# Patient Record
Sex: FEMALE | Race: WHITE | URBAN - METROPOLITAN AREA
[De-identification: names, ages, dates, MRNs, and addresses within clinical notes are randomized per-mention and may not be internally consistent; named-entity substitution may affect disease eponyms.]

---

## 2017-05-30 ENCOUNTER — OFFICE VISIT (OUTPATIENT)
Dept: FAMILY MEDICINE CLINIC | Facility: CLINIC | Age: 22
End: 2017-05-30

## 2017-05-30 VITALS
HEIGHT: 68.25 IN | RESPIRATION RATE: 16 BRPM | WEIGHT: 148.25 LBS | TEMPERATURE: 98 F | BODY MASS INDEX: 22.47 KG/M2 | DIASTOLIC BLOOD PRESSURE: 70 MMHG | HEART RATE: 86 BPM | SYSTOLIC BLOOD PRESSURE: 118 MMHG

## 2017-05-30 DIAGNOSIS — N92.0 MENORRHAGIA WITH REGULAR CYCLE: Primary | ICD-10-CM

## 2017-05-30 DIAGNOSIS — L73.9 FOLLICULITIS: ICD-10-CM

## 2017-05-30 DIAGNOSIS — Z12.4 PAP SMEAR FOR CERVICAL CANCER SCREENING: ICD-10-CM

## 2017-05-30 PROCEDURE — 88175 CYTOPATH C/V AUTO FLUID REDO: CPT | Performed by: FAMILY MEDICINE

## 2017-05-30 PROCEDURE — 99204 OFFICE O/P NEW MOD 45 MIN: CPT | Performed by: FAMILY MEDICINE

## 2017-05-30 RX ORDER — NORGESTIMATE AND ETHINYL ESTRADIOL 7DAYSX3 28
1 KIT ORAL DAILY
Qty: 3 PACKAGE | Refills: 3 | Status: SHIPPED | OUTPATIENT
Start: 2017-05-30 | End: 2017-11-20

## 2017-05-30 RX ORDER — ALBUTEROL SULFATE 90 UG/1
2 AEROSOL, METERED RESPIRATORY (INHALATION) AS NEEDED
COMMUNITY
End: 2018-01-10

## 2017-05-30 NOTE — PROGRESS NOTES
Precious Najjar is a 22year old female. HPI:       January-- noted back pain with menses. For last few months, legs tingly and sore, legs feel heavy.   Saw OB/GYNE- [facility]- no exam.  Offered ocp, told may be normal    Had \"bumps on vagina- inte NORMAL  VAGINA- NO DISCHAGE  CERVIX-NO LESION, PAP DONE  BIMANUAL- NO UTERINE OR ADENEXAL TENDERNESS OR MASSES    ASSESSMENT AND PLAN:   Pt presents for eval of menorhhagia  AND HX OF FOLLICULITIS  .      Patient Instructions   D/w pt use of HIBICLENSE anti

## 2017-05-30 NOTE — PATIENT INSTRUCTIONS
D/w pt use of HIBICLENSE antibacterial soap, use of disposable razors, rec appt when has gyne lesion for further eval    Pap today, HPV reflex if pap abnormal    recomment gardisil--HPV vaccine series    OCP to manange menses, cramping.   Recheck 3 months s

## 2017-06-06 ENCOUNTER — TELEPHONE (OUTPATIENT)
Dept: FAMILY MEDICINE CLINIC | Facility: CLINIC | Age: 22
End: 2017-06-06

## 2017-07-18 ENCOUNTER — TELEPHONE (OUTPATIENT)
Dept: FAMILY MEDICINE CLINIC | Facility: CLINIC | Age: 22
End: 2017-07-18

## 2017-07-18 NOTE — TELEPHONE ENCOUNTER
Pt can try restarting ocp after next menses with a meal. Or can try alternative pill ( ortho tri low or loestrin) OR alternative Contraceptive method.

## 2017-07-18 NOTE — TELEPHONE ENCOUNTER
Pt was seen on 5/30- placed on BCP- Ortho Tri-Cyclen. Pt stopped 1.5 wks ago due to severe nausea- pt would take at night, but does work some nights. Pt states nausea stopped when she stopped the pill. Please advise. LMP:  7/4/17.

## 2017-07-18 NOTE — TELEPHONE ENCOUNTER
Pt has decided to retry Ortho Tri-Cyclen again, pt will start pill pack with next cycle. Pt will monitor s/s and will call if needed -  urged pt not to stop abruptly and to call office with concerns.

## 2017-08-02 ENCOUNTER — TELEPHONE (OUTPATIENT)
Dept: FAMILY MEDICINE CLINIC | Facility: CLINIC | Age: 22
End: 2017-08-02

## 2017-08-02 NOTE — TELEPHONE ENCOUNTER
The menstrual period is the shedding of the lining of the uterus, therefore sometimes she will see tissue. Patient should let us know if she has heavy bleeding or other concerns.

## 2017-08-02 NOTE — TELEPHONE ENCOUNTER
Pt states she started her period today, states she passed a large clot that looked grey in color, states it looked like tissue. Pt started bitth control in May. Pt states her menstrul flow is normal, but wanted to let us know.

## 2017-11-20 ENCOUNTER — OFFICE VISIT (OUTPATIENT)
Dept: FAMILY MEDICINE CLINIC | Facility: CLINIC | Age: 22
End: 2017-11-20

## 2017-11-20 ENCOUNTER — TELEPHONE (OUTPATIENT)
Dept: FAMILY MEDICINE CLINIC | Facility: CLINIC | Age: 22
End: 2017-11-20

## 2017-11-20 VITALS
BODY MASS INDEX: 22.23 KG/M2 | HEART RATE: 72 BPM | SYSTOLIC BLOOD PRESSURE: 114 MMHG | DIASTOLIC BLOOD PRESSURE: 78 MMHG | RESPIRATION RATE: 16 BRPM | TEMPERATURE: 98 F | HEIGHT: 68.5 IN | WEIGHT: 148.38 LBS
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
## 2017-11-20 DIAGNOSIS — R11.0 NAUSEA: ICD-10-CM

## 2017-11-20 DIAGNOSIS — S00.03XA HEMATOMA OF SCALP, INITIAL ENCOUNTER: ICD-10-CM

## 2017-11-20 DIAGNOSIS — S06.0X0A CONCUSSION WITHOUT LOSS OF CONSCIOUSNESS, INITIAL ENCOUNTER: ICD-10-CM

## 2017-11-20 DIAGNOSIS — R42 DIZZINESS: ICD-10-CM

## 2017-11-20 DIAGNOSIS — S09.90XA INJURY OF HEAD, INITIAL ENCOUNTER: Primary | ICD-10-CM

## 2017-11-20 PROCEDURE — 99214 OFFICE O/P EST MOD 30 MIN: CPT | Performed by: FAMILY MEDICINE

## 2017-11-20 NOTE — PROGRESS NOTES
[facility] [place]  PROGRESS NOTE  Chief Complaint:   Patient presents with:  Head Injury: nausea last night, light sensitive, head feels heavy, headache, vision is more blurry       HPI:   This is a 22year old female presents with mom for eval Specimen Adequacy Satisfactory for evaluation.  Endocervical or metaplastic cells present    General Categorization Negative for intraepithelial lesion or malignancy     Final Diagnosis Comment Negative for intraepithelial lesion or malignancy [FORMATTING R CARDIOVASCULAR:  Denies chest pain, chest pressure, chest discomfort, palpitations, edema, dyspnea on exertion or at rest.  RESPIRATORY:  Denies shortness of breath, wheezing, cough or sputum.   GASTROINTESTINAL:  Denies abdominal pain, nausea, vomiting, co ABDOMEN: Soft, nondistended, nontender, bowel sounds normal in all 4 quadrants, no Masses, no hepatosplenomegaly. SKIN: No rashes, no skin lesion, no bruising, good turgor. LYMPHATIC: No cervical lymphadenopathy, no other lymphadenopathy.   Yvette Grain Do's and don'ts:   · Ask a friend or family member to stay with you for a few days. You should not be alone until you know how the injury has affected you. · Tell your caregiver to wake you every 2 to 3 hours during the first night.  Your caregiver should © 6006-0751 [employer] [address] [facility] [place], [facility] [place]. All rights reserved. This information is not intended as a substitute for professional medical care. Always follow your healthcare professional's instructions.             Emmy

## 2017-11-20 NOTE — PATIENT INSTRUCTIONS
Recommend to go to [facility] for stat CT scan of head. No school or work for today and tomorrow. Recommend rest, avoid any use of phone, computer, tv for next couple days. No exercise for next 2 days. Go to ER if symptoms worse.    Return healthcare provider and your family.  Work closely with your healthcare provider and give your brain time to heal.  Follow-up care  Follow up with your healthcare provider, or as advised.     When to call your healthcare provider  Your caregiver should call

## 2017-11-20 NOTE — TELEPHONE ENCOUNTER
Informed Tiffanie that Per authorization dept that its approved but Monika Gave is reaching out to patient to verify location. No other questions at this time.

## 2017-12-06 ENCOUNTER — OFFICE VISIT (OUTPATIENT)
Dept: FAMILY MEDICINE CLINIC | Facility: CLINIC | Age: 22
End: 2017-12-06

## 2017-12-06 ENCOUNTER — HOSPITAL ENCOUNTER (OUTPATIENT)
Dept: GENERAL RADIOLOGY | Age: 22
Discharge: HOME | End: 2017-12-06
Attending: NURSE PRACTITIONER
Payer: COMMERCIAL

## 2017-12-06 VITALS
OXYGEN SATURATION: 98 % | BODY MASS INDEX: 23 KG/M2 | WEIGHT: 151.63 LBS | SYSTOLIC BLOOD PRESSURE: 110 MMHG | DIASTOLIC BLOOD PRESSURE: 80 MMHG | TEMPERATURE: 98 F | HEART RATE: 93 BPM

## 2017-12-06 DIAGNOSIS — R05.9 COUGH: ICD-10-CM

## 2017-12-06 DIAGNOSIS — J40 BRONCHITIS: ICD-10-CM

## 2017-12-06 DIAGNOSIS — R05.9 COUGH: Primary | ICD-10-CM

## 2017-12-06 PROBLEM — F50.9 EATING DISORDER: Status: ACTIVE | Noted: 2017-10-08

## 2017-12-06 PROCEDURE — 99213 OFFICE O/P EST LOW 20 MIN: CPT | Performed by: NURSE PRACTITIONER

## 2017-12-06 PROCEDURE — 71020 XR CHEST PA + LAT CHEST (CPT=71020): CPT | Performed by: NURSE PRACTITIONER

## 2017-12-06 RX ORDER — ALBUTEROL SULFATE 2.5 MG/3ML
2.5 SOLUTION RESPIRATORY (INHALATION) EVERY 4 HOURS PRN
Qty: 1 BOX | Refills: 1 | Status: SHIPPED | OUTPATIENT
Start: 2017-12-06

## 2017-12-06 RX ORDER — PREDNISONE 20 MG/1
TABLET ORAL
Qty: 18 TABLET | Refills: 0 | Status: SHIPPED | OUTPATIENT
Start: 2017-12-06

## 2017-12-06 RX ORDER — AZITHROMYCIN 500 MG/1
500 TABLET, FILM COATED ORAL DAILY
Qty: 7 TABLET | Refills: 0 | Status: SHIPPED | OUTPATIENT
Start: 2017-12-06 | End: 2017-12-13

## 2017-12-06 NOTE — PATIENT INSTRUCTIONS
Rest, increase fluids, salt water gargles ,chari pot (use distilled water) or saline nasal spray, Mucinex-D (behind the counter), Tylenol/Ibuprofen, follow up if symptoms persist or increase.

## 2017-12-06 NOTE — PROGRESS NOTES
CHIEF COMPLAINT:   Patient presents with:  Cough: for 2 weeks now  Sinus Problem      HPI:   Vinayak Ty is a 25year old female who presents to clinic today with complaints of feeling ill for 2 weeks- started with coughing and chest tight conjunctiva clear, no discharge  EARS:. Tympanic membranes are clear bilaterally  NOSE: nostrils patent, mild congestion  LYMPH: Few anterior cervical bilateral lymphadenopathy.     THROAT: oral mucosa pink, moist. Posterior pharynx* erythematous or inject

## 2017-12-12 ENCOUNTER — OFFICE VISIT (OUTPATIENT)
Dept: FAMILY MEDICINE CLINIC | Facility: CLINIC | Age: 22
End: 2017-12-12

## 2017-12-12 VITALS
SYSTOLIC BLOOD PRESSURE: 116 MMHG | DIASTOLIC BLOOD PRESSURE: 68 MMHG | BODY MASS INDEX: 23.17 KG/M2 | RESPIRATION RATE: 32 BRPM | HEIGHT: 67.25 IN | HEART RATE: 92 BPM | TEMPERATURE: 98 F | WEIGHT: 149.38 LBS | OXYGEN SATURATION: 100 %

## 2017-12-12 DIAGNOSIS — R05.9 COUGH: Primary | ICD-10-CM

## 2017-12-12 DIAGNOSIS — J40 BRONCHITIS: ICD-10-CM

## 2017-12-12 PROCEDURE — 94060 EVALUATION OF WHEEZING: CPT | Performed by: FAMILY MEDICINE

## 2017-12-12 PROCEDURE — 87798 DETECT AGENT NOS DNA AMP: CPT | Performed by: FAMILY MEDICINE

## 2017-12-12 PROCEDURE — 99214 OFFICE O/P EST MOD 30 MIN: CPT | Performed by: FAMILY MEDICINE

## 2017-12-12 PROCEDURE — 87502 INFLUENZA DNA AMP PROBE: CPT | Performed by: FAMILY MEDICINE

## 2017-12-12 NOTE — PATIENT INSTRUCTIONS
rec continue inhaler    rec finish prednisone    Pt to have flu test    rec spirometry testing  In January

## 2017-12-13 ENCOUNTER — TELEPHONE (OUTPATIENT)
Dept: FAMILY MEDICINE CLINIC | Facility: CLINIC | Age: 22
End: 2017-12-13

## 2017-12-13 NOTE — TELEPHONE ENCOUNTER
----- Message from Carol Quintanilla MD sent at 12/13/2017  8:05 AM CST -----  Negative influenza. Please reassure patient. Okay to fax results to her employer if desired by the patient.

## 2018-01-10 RX ORDER — ALBUTEROL SULFATE 90 UG/1
2 AEROSOL, METERED RESPIRATORY (INHALATION) EVERY 4 HOURS PRN
Qty: 1 INHALER | Refills: 3 | Status: SHIPPED | OUTPATIENT
Start: 2018-01-10

## 2018-01-11 NOTE — TELEPHONE ENCOUNTER
Future appt:    Last Appointment:  12/12/2017-  Was advised to continue with inhaler at last sick visit. No results found for: CHOLEST, HDL, LDL, TRIGLY, TRIG  No results found for: EAG, A1C  No results found for: T4F, TSH, TSHT4    No Follow-up on file.

## 2021-03-27 NOTE — PROGRESS NOTES
CHIEF COMPLAINT:   Patient presents with:  Fever  URI: recently treated-  no much improved      HPI:   Mimi Younger is a 25year old female who presents to clinic today with complaints of continue cough      Pt here previously  Pt sent by e Problem: Potential for Falls  Goal: Patient will remain free of falls  Description: INTERVENTIONS:  - Assess patient frequently for physical needs  -  Identify cognitive and physical deficits and behaviors that affect risk of falls    -  [place] fall precautions as indicated by assessment   - Educate patient/family on patient safety including physical limitations  - Instruct patient to call for assistance with activity based on assessment  - Modify environment to reduce risk of injury  - Consider OT/PT consult to assist with strengthening/mobility  Outcome: Progressing Right arm, Patient Position: Sitting, Cuff Size: adult)   Pulse 92   Temp 97.8 °F (36.6 °C) (Tympanic)   Resp (!) 32   Ht 67.25\"   Wt 149 lb 6 oz   LMP 12/11/2017   SpO2 100%   BMI 23.22 kg/m²   GENERAL: well developed, well nourished,in no apparent distr

## (undated) NOTE — LETTER
Date: 12/6/2017    Patient Name: Venus Millard          To Whom it may concern: This letter has been written at the patient's request. The above patient was seen at the [facility] for treatment of a medical condition.     This

## (undated) NOTE — MR AVS SNAPSHOT
[place] [address]  [address] [id number] [phone]               Thank you for choosing us for your health care visit with Roslyn Boggs MD.  We are glad to serve you and happy to provide you with this summary visit,  view other health information, and more. To sign up or find more information, go to [email or web address] and click on the Sign Up Now link in the [employer] box.      Enter your [employer] Activation Code exactly as it appears below along with yo

## (undated) NOTE — LETTER
Date: 11/20/2017    Patient Name: Flores Dejesus          To Whom it may concern: This letter has been written at the patient's request. The above patient was seen at the [facility] for treatment of a medical condition.     This